# Patient Record
Sex: FEMALE | Race: ASIAN | NOT HISPANIC OR LATINO | Employment: PART TIME | URBAN - METROPOLITAN AREA
[De-identification: names, ages, dates, MRNs, and addresses within clinical notes are randomized per-mention and may not be internally consistent; named-entity substitution may affect disease eponyms.]

---

## 2017-06-16 ENCOUNTER — OFFICE VISIT - HEALTHEAST (OUTPATIENT)
Dept: FAMILY MEDICINE | Facility: CLINIC | Age: 42
End: 2017-06-16

## 2017-06-16 DIAGNOSIS — R21 RASH: ICD-10-CM

## 2017-08-07 ENCOUNTER — RECORDS - HEALTHEAST (OUTPATIENT)
Dept: ADMINISTRATIVE | Facility: OTHER | Age: 42
End: 2017-08-07

## 2017-09-24 ENCOUNTER — HEALTH MAINTENANCE LETTER (OUTPATIENT)
Age: 42
End: 2017-09-24

## 2017-12-20 ENCOUNTER — OFFICE VISIT - HEALTHEAST (OUTPATIENT)
Dept: FAMILY MEDICINE | Facility: CLINIC | Age: 42
End: 2017-12-20

## 2017-12-20 DIAGNOSIS — Z00.00 ROUTINE GENERAL MEDICAL EXAMINATION AT A HEALTH CARE FACILITY: ICD-10-CM

## 2017-12-20 DIAGNOSIS — E78.00 HYPERCHOLESTEREMIA: ICD-10-CM

## 2017-12-20 LAB
CHOLEST SERPL-MCNC: 208 MG/DL
FASTING STATUS PATIENT QL REPORTED: NO
HDLC SERPL-MCNC: 61 MG/DL
LDLC SERPL CALC-MCNC: 111 MG/DL
TRIGL SERPL-MCNC: 178 MG/DL

## 2017-12-20 ASSESSMENT — MIFFLIN-ST. JEOR: SCORE: 1216.95

## 2018-01-22 ENCOUNTER — HOSPITAL ENCOUNTER (OUTPATIENT)
Dept: ULTRASOUND IMAGING | Facility: CLINIC | Age: 43
Discharge: HOME | End: 2018-01-22
Attending: FAMILY MEDICINE

## 2018-01-22 ENCOUNTER — OFFICE VISIT - HEALTHEAST (OUTPATIENT)
Dept: FAMILY MEDICINE | Facility: CLINIC | Age: 43
End: 2018-01-22

## 2018-01-22 DIAGNOSIS — E06.1: ICD-10-CM

## 2018-01-22 DIAGNOSIS — E06.0 ACUTE THYROIDITIS: ICD-10-CM

## 2018-01-22 ASSESSMENT — MIFFLIN-ST. JEOR: SCORE: 1221.48

## 2018-01-23 LAB
T3 SERPL-MCNC: 118 NG/DL (ref 45–175)
T4 TOTAL - HISTORICAL: 9.2 UG/DL (ref 4.5–13)
TSH SERPL DL<=0.005 MIU/L-ACNC: 0.6 UIU/ML (ref 0.3–5)

## 2018-01-25 LAB — TSI SER-ACNC: <1 TSI INDEX

## 2018-02-05 ENCOUNTER — AMBULATORY - HEALTHEAST (OUTPATIENT)
Dept: LAB | Facility: CLINIC | Age: 43
End: 2018-02-05

## 2018-02-05 DIAGNOSIS — E06.1: ICD-10-CM

## 2018-02-05 LAB
ALBUMIN SERPL-MCNC: 3.3 G/DL (ref 3.5–5)
ALP SERPL-CCNC: 80 U/L (ref 45–120)
ALT SERPL W P-5'-P-CCNC: 10 U/L (ref 0–45)
AST SERPL W P-5'-P-CCNC: 14 U/L (ref 0–40)
BILIRUB DIRECT SERPL-MCNC: 0.1 MG/DL
BILIRUB SERPL-MCNC: 0.3 MG/DL (ref 0–1)
C REACTIVE PROTEIN LHE: 2.2 MG/DL (ref 0–0.8)
PROT SERPL-MCNC: 7 G/DL (ref 6–8)
TSH SERPL DL<=0.005 MIU/L-ACNC: 0.01 UIU/ML (ref 0.3–5)

## 2018-02-06 ENCOUNTER — COMMUNICATION - HEALTHEAST (OUTPATIENT)
Dept: FAMILY MEDICINE | Facility: CLINIC | Age: 43
End: 2018-02-06

## 2018-02-07 ENCOUNTER — COMMUNICATION - HEALTHEAST (OUTPATIENT)
Dept: FAMILY MEDICINE | Facility: CLINIC | Age: 43
End: 2018-02-07

## 2018-02-14 ENCOUNTER — OFFICE VISIT - HEALTHEAST (OUTPATIENT)
Dept: FAMILY MEDICINE | Facility: CLINIC | Age: 43
End: 2018-02-14

## 2018-02-14 DIAGNOSIS — E06.9 THYROIDITIS: ICD-10-CM

## 2018-02-14 ASSESSMENT — MIFFLIN-ST. JEOR: SCORE: 1226.02

## 2018-03-26 ENCOUNTER — COMMUNICATION - HEALTHEAST (OUTPATIENT)
Dept: LAB | Facility: CLINIC | Age: 43
End: 2018-03-26

## 2018-03-26 DIAGNOSIS — E06.0 ACUTE THYROIDITIS: ICD-10-CM

## 2018-03-27 ENCOUNTER — AMBULATORY - HEALTHEAST (OUTPATIENT)
Dept: LAB | Facility: CLINIC | Age: 43
End: 2018-03-27

## 2018-03-27 DIAGNOSIS — E06.0 ACUTE THYROIDITIS: ICD-10-CM

## 2018-03-27 LAB
T4 FREE SERPL-MCNC: 0.6 NG/DL (ref 0.7–1.8)
TSH SERPL DL<=0.005 MIU/L-ACNC: 0.14 UIU/ML (ref 0.3–5)

## 2018-06-06 ENCOUNTER — RECORDS - HEALTHEAST (OUTPATIENT)
Dept: ADMINISTRATIVE | Facility: OTHER | Age: 43
End: 2018-06-06

## 2018-12-14 ENCOUNTER — OFFICE VISIT - HEALTHEAST (OUTPATIENT)
Dept: FAMILY MEDICINE | Facility: CLINIC | Age: 43
End: 2018-12-14

## 2018-12-14 DIAGNOSIS — Z00.00 ROUTINE GENERAL MEDICAL EXAMINATION AT A HEALTH CARE FACILITY: ICD-10-CM

## 2018-12-14 DIAGNOSIS — E78.00 HYPERCHOLESTEREMIA: ICD-10-CM

## 2018-12-14 DIAGNOSIS — E06.0 ACUTE THYROIDITIS: ICD-10-CM

## 2018-12-14 LAB
ALBUMIN SERPL-MCNC: 4.3 G/DL (ref 3.5–5)
ALBUMIN UR-MCNC: NEGATIVE MG/DL
ALP SERPL-CCNC: 70 U/L (ref 45–120)
ALT SERPL W P-5'-P-CCNC: 16 U/L (ref 0–45)
ANION GAP SERPL CALCULATED.3IONS-SCNC: 8 MMOL/L (ref 5–18)
APPEARANCE UR: CLEAR
AST SERPL W P-5'-P-CCNC: 27 U/L (ref 0–40)
BILIRUB SERPL-MCNC: 0.7 MG/DL (ref 0–1)
BILIRUB UR QL STRIP: NEGATIVE
BUN SERPL-MCNC: 13 MG/DL (ref 8–22)
CALCIUM SERPL-MCNC: 9.8 MG/DL (ref 8.5–10.5)
CHLORIDE BLD-SCNC: 103 MMOL/L (ref 98–107)
CHOLEST SERPL-MCNC: 234 MG/DL
CO2 SERPL-SCNC: 31 MMOL/L (ref 22–31)
COLOR UR AUTO: YELLOW
CREAT SERPL-MCNC: 0.8 MG/DL (ref 0.6–1.1)
FASTING STATUS PATIENT QL REPORTED: ABNORMAL
GFR SERPL CREATININE-BSD FRML MDRD: >60 ML/MIN/1.73M2
GLUCOSE BLD-MCNC: 95 MG/DL (ref 70–125)
GLUCOSE UR STRIP-MCNC: NEGATIVE MG/DL
HDLC SERPL-MCNC: 75 MG/DL
HGB BLD-MCNC: 14.3 G/DL (ref 12–16)
HGB UR QL STRIP: NEGATIVE
KETONES UR STRIP-MCNC: NEGATIVE MG/DL
LDLC SERPL CALC-MCNC: 143 MG/DL
LEUKOCYTE ESTERASE UR QL STRIP: NEGATIVE
NITRATE UR QL: NEGATIVE
PH UR STRIP: 7.5 [PH] (ref 5–8)
POTASSIUM BLD-SCNC: 4.1 MMOL/L (ref 3.5–5)
PROT SERPL-MCNC: 7.3 G/DL (ref 6–8)
SODIUM SERPL-SCNC: 142 MMOL/L (ref 136–145)
SP GR UR STRIP: 1.01 (ref 1–1.03)
T4 FREE SERPL-MCNC: 1 NG/DL (ref 0.7–1.8)
TRIGL SERPL-MCNC: 79 MG/DL
TSH SERPL DL<=0.005 MIU/L-ACNC: 4.6 UIU/ML (ref 0.3–5)
UROBILINOGEN UR STRIP-ACNC: NORMAL

## 2018-12-17 LAB
HPV SOURCE: NORMAL
HUMAN PAPILLOMA VIRUS 16 DNA: NEGATIVE
HUMAN PAPILLOMA VIRUS 18 DNA: NEGATIVE
HUMAN PAPILLOMA VIRUS FINAL DIAGNOSIS: NORMAL
HUMAN PAPILLOMA VIRUS OTHER HR: NEGATIVE
SPECIMEN DESCRIPTION: NORMAL

## 2019-02-16 ENCOUNTER — AMBULATORY - HEALTHEAST (OUTPATIENT)
Dept: FAMILY MEDICINE | Facility: CLINIC | Age: 44
End: 2019-02-16

## 2019-02-16 ENCOUNTER — COMMUNICATION - HEALTHEAST (OUTPATIENT)
Dept: SCHEDULING | Facility: CLINIC | Age: 44
End: 2019-02-16

## 2019-02-16 DIAGNOSIS — J22 LOWER RESP. TRACT INFECTION: ICD-10-CM

## 2019-02-17 ENCOUNTER — COMMUNICATION - HEALTHEAST (OUTPATIENT)
Dept: SCHEDULING | Facility: CLINIC | Age: 44
End: 2019-02-17

## 2019-02-17 DIAGNOSIS — J22 LOWER RESP. TRACT INFECTION: ICD-10-CM

## 2019-05-15 ENCOUNTER — RECORDS - HEALTHEAST (OUTPATIENT)
Dept: ADMINISTRATIVE | Facility: OTHER | Age: 44
End: 2019-05-15

## 2019-08-19 ENCOUNTER — OFFICE VISIT - HEALTHEAST (OUTPATIENT)
Dept: FAMILY MEDICINE | Facility: CLINIC | Age: 44
End: 2019-08-19

## 2019-08-19 ENCOUNTER — RECORDS - HEALTHEAST (OUTPATIENT)
Dept: GENERAL RADIOLOGY | Facility: CLINIC | Age: 44
End: 2019-08-19

## 2019-08-19 ENCOUNTER — COMMUNICATION - HEALTHEAST (OUTPATIENT)
Dept: FAMILY MEDICINE | Facility: CLINIC | Age: 44
End: 2019-08-19

## 2019-08-19 DIAGNOSIS — R93.89 ABNORMAL X-RAY OF NECK: ICD-10-CM

## 2019-08-19 DIAGNOSIS — M54.2 NECK PAIN: ICD-10-CM

## 2019-08-19 DIAGNOSIS — M54.2 CERVICALGIA: ICD-10-CM

## 2019-08-19 DIAGNOSIS — G24.3 SPASMODIC TORTICOLLIS: ICD-10-CM

## 2019-08-19 ASSESSMENT — MIFFLIN-ST. JEOR: SCORE: 1235.09

## 2019-08-29 ENCOUNTER — COMMUNICATION - HEALTHEAST (OUTPATIENT)
Dept: FAMILY MEDICINE | Facility: CLINIC | Age: 44
End: 2019-08-29

## 2019-08-29 DIAGNOSIS — G24.3 SPASMODIC TORTICOLLIS: ICD-10-CM

## 2019-12-12 ENCOUNTER — OFFICE VISIT - HEALTHEAST (OUTPATIENT)
Dept: FAMILY MEDICINE | Facility: CLINIC | Age: 44
End: 2019-12-12

## 2019-12-12 DIAGNOSIS — Z12.4 PAP SMEAR FOR CERVICAL CANCER SCREENING: ICD-10-CM

## 2019-12-12 DIAGNOSIS — Z00.00 PREVENTATIVE HEALTH CARE: ICD-10-CM

## 2019-12-12 LAB
ANION GAP SERPL CALCULATED.3IONS-SCNC: 9 MMOL/L (ref 5–18)
BUN SERPL-MCNC: 15 MG/DL (ref 8–22)
CALCIUM SERPL-MCNC: 9.8 MG/DL (ref 8.5–10.5)
CHLORIDE BLD-SCNC: 103 MMOL/L (ref 98–107)
CHOLEST SERPL-MCNC: 254 MG/DL
CO2 SERPL-SCNC: 30 MMOL/L (ref 22–31)
CREAT SERPL-MCNC: 0.78 MG/DL (ref 0.6–1.1)
ERYTHROCYTE [DISTWIDTH] IN BLOOD BY AUTOMATED COUNT: 11.2 % (ref 11–14.5)
FASTING STATUS PATIENT QL REPORTED: YES
GFR SERPL CREATININE-BSD FRML MDRD: >60 ML/MIN/1.73M2
GLUCOSE BLD-MCNC: 94 MG/DL (ref 70–125)
HCT VFR BLD AUTO: 41.3 % (ref 35–47)
HDLC SERPL-MCNC: 71 MG/DL
HGB BLD-MCNC: 14.4 G/DL (ref 12–16)
LDLC SERPL CALC-MCNC: 169 MG/DL
MCH RBC QN AUTO: 32.5 PG (ref 27–34)
MCHC RBC AUTO-ENTMCNC: 34.8 G/DL (ref 32–36)
MCV RBC AUTO: 93 FL (ref 80–100)
PLATELET # BLD AUTO: 165 THOU/UL (ref 140–440)
PMV BLD AUTO: 7.7 FL (ref 7–10)
POTASSIUM BLD-SCNC: 4.1 MMOL/L (ref 3.5–5)
RBC # BLD AUTO: 4.42 MILL/UL (ref 3.8–5.4)
SODIUM SERPL-SCNC: 142 MMOL/L (ref 136–145)
TRIGL SERPL-MCNC: 70 MG/DL
TSH SERPL DL<=0.005 MIU/L-ACNC: 3.87 UIU/ML (ref 0.3–5)
WBC: 3.6 THOU/UL (ref 4–11)

## 2019-12-12 ASSESSMENT — MIFFLIN-ST. JEOR: SCORE: 1216.95

## 2019-12-20 LAB
BKR LAB AP ABNORMAL BLEEDING: NO
BKR LAB AP BIRTH CONTROL/HORMONES: NORMAL
BKR LAB AP CERVICAL APPEARANCE: NORMAL
BKR LAB AP GYN ADEQUACY: NORMAL
BKR LAB AP GYN INTERPRETATION: NORMAL
BKR LAB AP HPV REFLEX: NORMAL
BKR LAB AP LMP: NORMAL
BKR LAB AP PATIENT STATUS: NORMAL
BKR LAB AP PREVIOUS ABNORMAL: NO
BKR LAB AP PREVIOUS NORMAL: NORMAL
HIGH RISK?: NO
PATH REPORT.COMMENTS IMP SPEC: NORMAL
RESULT FLAG (HE HISTORICAL CONVERSION): NORMAL

## 2020-01-30 ENCOUNTER — COMMUNICATION - HEALTHEAST (OUTPATIENT)
Dept: FAMILY MEDICINE | Facility: CLINIC | Age: 45
End: 2020-01-30

## 2020-02-03 ENCOUNTER — OFFICE VISIT - HEALTHEAST (OUTPATIENT)
Dept: FAMILY MEDICINE | Facility: CLINIC | Age: 45
End: 2020-02-03

## 2020-02-03 DIAGNOSIS — R21 RASH: ICD-10-CM

## 2020-02-03 RX ORDER — RIBOFLAVIN (VITAMIN B2) 100 MG
1 TABLET ORAL 3 TIMES DAILY
Status: SHIPPED | COMMUNITY
Start: 2020-02-03

## 2020-02-03 RX ORDER — TRIAMCINOLONE ACETONIDE 1 MG/G
CREAM TOPICAL
Qty: 45 G | Refills: 0 | Status: SHIPPED | OUTPATIENT
Start: 2020-02-03

## 2020-02-03 ASSESSMENT — MIFFLIN-ST. JEOR: SCORE: 1221.48

## 2021-05-26 ENCOUNTER — RECORDS - HEALTHEAST (OUTPATIENT)
Dept: ADMINISTRATIVE | Facility: CLINIC | Age: 46
End: 2021-05-26

## 2021-05-31 VITALS — HEIGHT: 65 IN | BODY MASS INDEX: 21.33 KG/M2 | WEIGHT: 128 LBS

## 2021-05-31 VITALS — HEIGHT: 65 IN | WEIGHT: 127 LBS | BODY MASS INDEX: 21.16 KG/M2

## 2021-05-31 VITALS — BODY MASS INDEX: 20.35 KG/M2 | WEIGHT: 122.3 LBS

## 2021-05-31 NOTE — PROGRESS NOTES
PROGRESS NOTE       SUBJECTIVE:  Kina Brown is a 43 y.o. female   Chief Complaint   Patient presents with     Shoulder Pain     pt having shoulder and neck pain, on going since thursday, did accupunture , not sleeping    Patient states that she is suffering with left neck and shoulder pain.  [facility] [employer]  Wednesday she was perfectly fine and then Thursday it began.  She also has some low back pain that comes and goes but nothing bad.  She did seek care for acupuncture which helped temporarily but then it became worse on Sunday morning she has been taking medications which were her 's (Guille) Flexeril 10 mg twice daily and oxycodone 5 mg once or twice daily.  She took a gabapentin 300 mg last night and slept for 12 hours.  We talked about how it is not wise to take her 's medications which she knows.  She denies pain radiating down into her arms and she has no numbness or weakness in her arms.    Patient Active Problem List   Diagnosis     Anxiety     Nonspecific reaction to tuberculin skin test without active tuberculosis     Hypercholesteremia     Acute thyroiditis     Abnormal x-ray of neck       Current Outpatient Medications   Medication Sig Dispense Refill     ascorbic acid, vitamin C, (VITAMIN C) 1000 MG tablet Take 1,000 mg by mouth daily.       calcium carbonate (OS-TED) 600 mg (1,500 mg) tablet Take 600 mg by mouth 2 (two) times a day with meals.       cholecalciferol, vitamin D3, (VITAMIN D3) 2,000 unit Tab Take 1 tablet (2,000 Units total) by mouth daily. 90 each 3     MULTIVIT-MINERALS/FERROUS FUM (MULTI VITAMIN ORAL) Take by mouth.       cyclobenzaprine (FLEXERIL) 10 MG tablet Take 1 tablet (10 mg total) by mouth 3 (three) times a day as needed for muscle spasms. 30 tablet 0     oxyCODONE (ROXICODONE) 5 MG immediate release tablet Take 1 tablet (5 mg total) by mouth every 6 (six) hours as needed for pain. 20 tablet 0     No current  facility-administered medications for this visit.        Social History     Tobacco Use   Smoking Status Former Smoker     Packs/day: 1.00     Years: 10.00     Pack years: 10.00     Types: Cigarettes     Last attempt to quit: 1/1/2004     Years since quitting: 15.6   Smokeless Tobacco Never Used       REVIEW OF SYSTEMS:  Patient denies fever, chills, dizziness, headache, visual change, ear pain, cough, chest pain, shortness of breath, abdominal pain,  swelling, rash,  depression or anxiety.    Positive:   extremity pain.    OBJECTIVE:       Vitals:    08/19/19 1139   BP: 118/88   Pulse: 60   SpO2: 100%     Weight: 131 lb (59.4 kg)    Wt Readings from Last 3 Encounters:   08/19/19 131 lb (59.4 kg)   12/14/18 132 lb 12.8 oz (60.2 kg)   02/14/18 129 lb (58.5 kg)     Body mass index is 21.8 kg/m .        Physical Exam:  GENERAL APPEARANCE: Patient appears to be uncomfortable and holds her head very rigidly.  Her lungs are clear and heart sounds are normal.  She is able to turn her head full range of motion but is very cautious.  It is any pain radiation down her arms when she does this.  She was also able to fully raise her chin and lower her chin, but again,  was very cautious in doing so.  EXTREMITY: Extremities normal, atraumatic, no swelling  NEURO: no gross deficits.  Muscle strength appears intact and DTRs are full  PSYCHIATRIC:  Mood appropriate, memory intact        ASSESSMENT/PLAN:     1. Neck pain  I reviewed the films myself and they appear normal with the exception of loss of lordosis  - XR Cervical Spine 4 - 5 VWS; Future  2. Spasmodic torticollis  I think her treatment regimen is appropriate and I have provided her her own prescription for cyclobenzaprine 10 mg which she may take 3 times daily as needed.  This can cause drowsiness and she will be careful about this with driving.  Oxycodone should be reserved for severe pain only especially at night.  Generally torticollis responds best to manipulation and  patient does confess to me that she has an appointment with the chiropractor later today.  In this case, I think it is quite appropriate as long as she is familiar with the chiropractor and is done well with that treatment before.  Torticollis can often last week to 10 days.  - cyclobenzaprine (FLEXERIL) 10 MG tablet; Take 1 tablet (10 mg total) by mouth 3 (three) times a day as needed for muscle spasms.  Dispense: 30 tablet; Refill: 0  - oxyCODONE (ROXICODONE) 5 MG immediate release tablet; Take 1 tablet (5 mg total) by mouth every 6 (six) hours as needed for pain.  Dispense: 20 tablet; Refill: 0        I spent a total of 28 minutes face to face with the patient.  Over 50% of the time spent counseling and educating the patient about all of the above.      Carmen Edwards MD

## 2021-05-31 NOTE — TELEPHONE ENCOUNTER
Controlled Substance Refill Request  Medication:   Requested Prescriptions     Pending Prescriptions Disp Refills     oxyCODONE (ROXICODONE) 5 MG immediate release tablet 20 tablet 0     Sig: Take 1 tablet (5 mg total) by mouth every 6 (six) hours as needed for pain.     Date Last Fill: 8/19/19  Pharmacy: [id number]   Submit electronically to pharmacy  Controlled Substance Agreement on File:   Encounter-Level CSA Scan Date:    There are no encounter-level csa scan date.       Last office visit: Last office visit pertaining to requested medication was 8/19/19.

## 2021-06-01 ENCOUNTER — RECORDS - HEALTHEAST (OUTPATIENT)
Dept: ADMINISTRATIVE | Facility: CLINIC | Age: 46
End: 2021-06-01

## 2021-06-01 VITALS — WEIGHT: 129 LBS | HEIGHT: 65 IN | BODY MASS INDEX: 21.49 KG/M2

## 2021-06-02 VITALS — BODY MASS INDEX: 22.1 KG/M2 | WEIGHT: 132.8 LBS

## 2021-06-03 VITALS — HEIGHT: 65 IN | BODY MASS INDEX: 21.83 KG/M2 | WEIGHT: 131 LBS

## 2021-06-04 VITALS
SYSTOLIC BLOOD PRESSURE: 114 MMHG | HEIGHT: 65 IN | HEART RATE: 64 BPM | WEIGHT: 127 LBS | DIASTOLIC BLOOD PRESSURE: 70 MMHG | BODY MASS INDEX: 21.16 KG/M2 | TEMPERATURE: 97.6 F

## 2021-06-04 VITALS
HEIGHT: 65 IN | DIASTOLIC BLOOD PRESSURE: 70 MMHG | BODY MASS INDEX: 21.33 KG/M2 | SYSTOLIC BLOOD PRESSURE: 100 MMHG | HEART RATE: 62 BPM | WEIGHT: 128 LBS

## 2021-06-04 NOTE — PROGRESS NOTES
Kina Brown is a 44 y.o. female is here for a  Health Maintenance exam. Patient is overall doing well.      Healthy Habits:   Regular Exercise: Yes  Sunscreen Use: Yes  Healthy Diet: Yes  Dental Visits Regularly: Yes  Seat Belt: Yes  Sexually active: Yes  Self Breast Exam Monthly:Yes and No  Hemoccults: No  Flex Sig: No  Colonoscopy: No  Lipid Profile: Yes  Glucose Screen: Yes  Prevention of Osteoporosis: Yes  Last Dexa: No  Guns at Home:  Yes  Guns Safety Locks:  Yes  Domestic Violence:  No    Current Outpatient Medications Include:    Current Outpatient Medications:      calcium carbonate (OS-TED) 600 mg (1,500 mg) tablet, Take 600 mg by mouth 2 (two) times a day with meals., Disp: , Rfl:      cholecalciferol, vitamin D3, (VITAMIN D3) 2,000 unit Tab, Take 1 tablet (2,000 Units total) by mouth daily., Disp: 90 each, Rfl: 3     Lactobacillus acidophilus (PROBIOTIC ORAL), Take by mouth., Disp: , Rfl:      MULTIVIT-MINERALS/FERROUS FUM (MULTI VITAMIN ORAL), Take by mouth., Disp: , Rfl:      ascorbic acid, vitamin C, (VITAMIN C) 1000 MG tablet, Take 1,000 mg by mouth daily., Disp: , Rfl:      cyclobenzaprine (FLEXERIL) 10 MG tablet, Take 1 tablet (10 mg total) by mouth 3 (three) times a day as needed for muscle spasms., Disp: 30 tablet, Rfl: 0     oxyCODONE (ROXICODONE) 5 MG immediate release tablet, Take 1 tablet (5 mg total) by mouth every 6 (six) hours as needed for pain., Disp: 20 tablet, Rfl: 0    Allergies:  No Known Allergies    Past Medical History:   Diagnosis Date     History of MRSA infection      Hypercholesteremia      Miscarriage     no complications     Normal delivery     x2     Thyroiditis     acute     Type O blood, Rh positive        Past Surgical History:   Procedure Laterality Date     DC  DELIVERY ONLY      Description:  Section;  Recorded: 2013;     DC SUPRACERV ABD HYSTERECTOMY      Supracervical Hysterectomy; Emergency at time of  Section for placenta previa.   Left tube and ovary also removed (adherent to the uterus)       OB History    Para Term  AB Living   4 3 3 0 1 3   SAB TAB Ectopic Multiple Live Births   1   0 0 3      # Outcome Date GA Lbr Syed/2nd Weight Sex Delivery Anes PTL Lv   4 SAB            3 Term     M Vag-Spont      2 Term     M Vag-Spont      1 Term     F CS-Unspec         Birth Comments: placenta previa       Immunization History   Administered Date(s) Administered     Hep A, historic 2010, 2012     Hep B, Adult 2007, 2012     Influenza, Live, Nasal LAIV3 10/12/2010     Influenza, Seasonal, Inj PF IIV3 2012     Influenza, inj, historic,unspecified 10/20/2013, 2015, 2016, 10/18/2017     Influenza,live, Nasal Laiv4 2014     Influenza,seasonal quad, PF, =/> 6months 2013     Influenza,seasonal,quad inj =/> 6months 10/15/2018     Td,adult,historic,unspecified 2002     Tdap 2010       Family History   Problem Relation Age of Onset     Liver cancer Father      No Medical Problems Brother         , LIVES IN [place]     Hypertension Mother      Dementia Mother        Social History     Socioeconomic History     Marital status:      Spouse name: Guille     Number of children: 3     Years of education: Not on file     Highest education level: Not on file   Occupational History     Occupation: RN     Employer: [employer]   Social Needs     Financial resource strain: Not on file     Food insecurity:     Worry: Not on file     Inability: Not on file     Transportation needs:     Medical: Not on file     Non-medical: Not on file   Tobacco Use     Smoking status: Former Smoker     Packs/day: 1.00     Years: 10.00     Pack years: 10.00     Types: Cigarettes     Last attempt to quit: 2004     Years since quitting: 15.9     Smokeless tobacco: Never Used   Substance and Sexual Activity     Alcohol use: Yes     Frequency: 2-4 times a month     Drinks per  session: 1 or 2     Binge frequency: Never     Comment: occasional, once per week     Drug use: No     Sexual activity: Yes     Partners: Male     Birth control/protection: Surgical     Comment: hysterectomy   Lifestyle     Physical activity:     Days per week: Not on file     Minutes per session: Not on file     Stress: Not on file   Relationships     Social connections:     Talks on phone: Not on file     Gets together: Not on file     Attends Scientologist service: Not on file     Active member of club or organization: Not on file     Attends meetings of clubs or organizations: Not on file     Relationship status: Not on file     Intimate partner violence:     Fear of current or ex partner: Not on file     Emotionally abused: Not on file     Physically abused: Not on file     Forced sexual activity: Not on file   Other Topics Concern     Not on file   Social History Narrative    1st born [place].         Last cholesterol:   Lab Results   Component Value Date    CHOL 234 (H) 2018    CHOL 208 (H) 2017    CHOL 217 (H) 2016     Lab Results   Component Value Date    HDL 75 2018    HDL 61 2017    HDL 55 2016     Lab Results   Component Value Date    LDLCALC 143 (H) 2018    LDLCALC 111 2017    LDLCALC 137 (H) 2016     Lab Results   Component Value Date    TRIG 79 2018    TRIG 178 (H) 2017    TRIG 127 2016     No components found for: CHOLHDL    Mammogram 5/15/19  Pap smear      Birth Control Method: None  High Risk/Behavior: None      LMP: No LMP recorded. Patient has had a hysterectomy.  Menstrual Regularity: None  Flow: None    Mammogram done 5/15/2019  Pap smear: Last year.  (Patient had a supracervical emergency hysterectomy for bleeding during a  for placenta previa)  Review of Systems:   General:  Denies fever, chills, HA, fatigue, myalgias, weight change    Eyes: Denies vision changes   Ears/Nose/Throat: Denies nasal  "congestion, rhinorrhea, ear pain or discharge, sore throat, swollen glands  Cardiovascular: Denies CP, palpitations  Respiratory:  Denies SOB, cough  Gastrointestinal:  Denies changes in bowel habits, melena, rectal bleeding,  Genitourinary: Denies changes in urine habits/frequency/dysuria, hematuria   Musculoskeletal:  Denies  joint pain or swelling or erythema, edema  Skin: Denies rashes   Neurologic: Denies weakness, paresthesia  Psychiatric: Denies mood changes   Endocrine: Denies polyuria, polydipsia, polyphagia  Heme/Lymphatic: Denies problem with bleeding   Allergic/Immunologic: Denies problem     POSITIVES: 112 point review of systems is negative  Patient is  and her  Guille was diagnosed with papillary thyroid cancer at [facility] in March of this year.  He has done quite well.  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  She drinks occasional alcoholic beverage but not even weekly.  She used to smoke but that was over 10 years ago.  She eats healthy and exercises regularly.    PHYSICAL EXAM:    /70   Pulse 64   Temp 97.6  F (36.4  C)   Ht 5' 5\" (1.651 m)   Wt 127 lb (57.6 kg)   Breastfeeding No   BMI 21.13 kg/m      Wt Readings from Last 3 Encounters:   08/19/19 131 lb (59.4 kg)   12/14/18 132 lb 12.8 oz (60.2 kg)   02/14/18 129 lb (58.5 kg)       Body mass index is 21.13 kg/m .    Well developed, well nourished, no acute distress.  HEENT: normocephalic/atraumatic, PERRLA/EOMI, TMs: Gray, normal light reflex, no nasal discharge.  Oral mucosa: no erythema/exudate  Neck: No LAD/masses/thyromegaly/bruits  Lungs: clear bilaterally  Heart: regular rate and rhythm, no murmurs/gallops/rubs  Breasts: symmetric, no masses/skin changes, nipple discharge, or axillary LAD.  BSE reviewed.  Abdomen: Normal bowel sounds, soft, non-tender, non-distended, no masses, neg Reyna's/McBurney's, no rebound/guarding  Genital: Normal external genitalia, no discharge, no lesions, cervix is non-friable, os is " closed, no CMT, no adnexal tenderness or fullness.  Uterus is surgically absent, perineum intact.  Thin prep done.    Rectal: internal exam is deferred.  External exam is normal.  Lymphatics: no supraclavicular/axillary/epitrochlear/inguinal LAD. No edema.  Neuro: A&O x 3, CN II-XII intact, strength 5/5, reflexes symmetric, sensory intact to light touch.  Psych: Behavior appropriate, engaging.  Thought processes congruent, non-tangential.  Musculoskeletal: Extremities normal, atraumatic, no swelling  Skin: no rashes or lesions.      Recent Results (from the past 240 hour(s))   Thyroid Stimulating Hormone (TSH)   Result Value Ref Range    TSH 3.87 0.30 - 5.00 uIU/mL   HM2(CBC w/o Differential)   Result Value Ref Range    WBC 3.6 (L) 4.0 - 11.0 thou/uL    RBC 4.42 3.80 - 5.40 mill/uL    Hemoglobin 14.4 12.0 - 16.0 g/dL    Hematocrit 41.3 35.0 - 47.0 %    MCV 93 80 - 100 fL    MCH 32.5 27.0 - 34.0 pg    MCHC 34.8 32.0 - 36.0 g/dL    RDW 11.2 11.0 - 14.5 %    Platelets 165 140 - 440 thou/uL    MPV 7.7 7.0 - 10.0 fL   Basic Metabolic Panel   Result Value Ref Range    Sodium 142 136 - 145 mmol/L    Potassium 4.1 3.5 - 5.0 mmol/L    Chloride 103 98 - 107 mmol/L    CO2 30 22 - 31 mmol/L    Anion Gap, Calculation 9 5 - 18 mmol/L    Glucose 94 70 - 125 mg/dL    Calcium 9.8 8.5 - 10.5 mg/dL    BUN 15 8 - 22 mg/dL    Creatinine 0.78 0.60 - 1.10 mg/dL    GFR MDRD Af Amer >60 >60 mL/min/1.73m2    GFR MDRD Non Af Amer >60 >60 mL/min/1.73m2   Lipid Cascade   Result Value Ref Range    Cholesterol 254 (H) <=199 mg/dL    Triglycerides 70 <=149 mg/dL    HDL Cholesterol 71 >=50 mg/dL    LDL Calculated 169 (H) <=129 mg/dL    Patient Fasting > 8hrs? Yes        ASSESSMENT/PLAN: 44 y.o. female physical exam and pap smear.          1. Preventative health care    - Thyroid Stimulating Hormone (TSH)  - HM2(CBC w/o Differential)  - Basic Metabolic Panel  - Lipid Cascade    2. Pap smear for cervical cancer screening    - Gynecologic Cytology  (PAP Smear)      There are no discontinued medications.    Routine health maintenance discussion:  No smoking, limited alcohol (7 or less servings per week), 5 fruits/veg servings per day, 200 minutes of exercise per week.  Daily calcium/vitamin D guidelines, bone health, yearly mammogram after age 39/regular pap smears/colon cancer screening beginning at age 50.  Accident avoidance, sun screen.      Will contact her with the results of the labs when available.    Carmen Edwards M.D.

## 2021-06-05 NOTE — PROGRESS NOTES
PROGRESS NOTE       SUBJECTIVE:  Kina Brown is a 44 y.o. female   Chief Complaint   Patient presents with     Rash     on left elbow  itching, comes and goes,  for years , started on knee and has cleared.     Patient states that she has had intermittent rash on her elbows for a long long time.  It is worse on the left elbow and it is always been present but sometimes it gets worse than others.  She has not noticed it on her knees.  It itches when it gets inflamed.  When it gets worse it then it goes to the right elbow.  She is simply used over-the-counter preparations which is soothing but does not control the rash.    Patient is frustrated in her current job working at [employer], not the work so much but the social problems that the people are involved in.  We talked about finding work that is rewarding and it sounds like she needs to move on.    Patient states that whenever she gets a cold it moves into her chest and she wonders why that is.  Is or something wrong with her lungs.  She has a history of smoking 1 pack a day for about 10 years but it was actually less than that.  Denies exertional shortness of breath and she does not have allergies.    Patient Active Problem List   Diagnosis     Anxiety     Nonspecific reaction to tuberculin skin test without active tuberculosis     Hypercholesteremia     Acute thyroiditis     Abnormal x-ray of neck       Current Outpatient Medications   Medication Sig Dispense Refill     ascorbic acid, vitamin C, (VITAMIN C) 1000 MG tablet Take 1,000 mg by mouth daily.       calcium carbonate (OS-TED) 600 mg (1,500 mg) tablet Take 600 mg by mouth 2 (two) times a day with meals.       cholecalciferol, vitamin D3, (VITAMIN D3) 2,000 unit Tab Take 1 tablet (2,000 Units total) by mouth daily. 90 each 3     glucosamine-chondroitin 500-400 mg tablet Take 1 tablet by mouth 3 (three) times a day.       Lactobacillus acidophilus (PROBIOTIC ORAL) Take by mouth.       MULTIVIT-MINERALS/FERROUS  FUM (MULTI VITAMIN ORAL) Take by mouth.       triamcinolone (KENALOG) 0.1 % cream Apply sparingly to rash twice daily until clear, then repeat as needed. 45 g 0     No current facility-administered medications for this visit.        Social History     Tobacco Use   Smoking Status Former Smoker     Packs/day: 1.00     Years: 10.00     Pack years: 10.00     Types: Cigarettes     Last attempt to quit: 2004     Years since quittin.1   Smokeless Tobacco Never Used       REVIEW OF SYSTEMS:  Patient denies fever, chills, dizziness, headache, visual change, ear pain, cough, chest pain, shortness of breath, abdominal pain, extremity pain or swelling, rash,  depression or anxiety.    OBJECTIVE:       Vitals:    20 1353   BP: 100/70   Pulse: 62     Weight: 128 lb (58.1 kg)    Wt Readings from Last 3 Encounters:   20 128 lb (58.1 kg)   19 127 lb (57.6 kg)   19 131 lb (59.4 kg)     Body mass index is 21.3 kg/m .        Physical Exam:  GENERAL APPEARANCE: A&A, NAD, well hydrated, well nourished  SKIN:  Normal skin turgor, she has thickened flat lesions on her left elbow without scaling, right elbow appears clear with may be just a suggestion of some thickened skin.  She has no rash on her knees.  NECK: Supple, without lymphadenopathy, no thyroid mass  CV: RRR, no M/G/R   LUNGS: CTAB, normal respiratory effort  PSYCHIATRIC:  Mood appropriate, memory intact        ASSESSMENT/PLAN:     1. Rash  This appears to be a mild psoriasis.  I recommended treating with Kenalog cream sparingly twice daily until clear then continue her over-the-counter products.  She understands that if she were to use this medicine on normal skin it can cause thinning and even stretch marks.  - triamcinolone (KENALOG) 0.1 % cream; Apply sparingly to rash twice daily until clear, then repeat as needed.  Dispense: 45 g; Refill: 0      There are no Patient Instructions on file for this visit.  Medications Discontinued During This  Encounter   Medication Reason     cyclobenzaprine (FLEXERIL) 10 MG tablet Therapy completed     oxyCODONE (ROXICODONE) 5 MG immediate release tablet Therapy completed     No follow-ups on file.    I spent a total of 20 minutes face to face with the patient.  Over 50% of the time spent counseling and educating the patient about all of the above.      Carmen Edwards MD

## 2021-06-11 NOTE — PROGRESS NOTES
OV   6/16/2017  Assessment & Plan:      1. Rash  sulfamethoxazole-trimethoprim (BACTRIM DS) 800-160 mg per tablet    Ambulatory referral to Dermatology         We reviewed the potential etiologies for her rash symptoms and we will cover with oral antibiotics. Patient does have Triamcinolone cream at home so we discussed applying this to the four sites the are infected. She will apply this daily for 3 weeks. We reviewed indications for re-evaluation and she will call or return to clinic with any additional problems or concerns. Referral placed for dermatology consult. Patient will follow up on an as needed basis. 20 minutes spent together with the patient today, more than 50% spent in counseling, discussing the above topics.        Subjective:               Kina Brown is a 41 y.o. female who presents for evaluation of a rash/lesion involving the right (thumb) finger, forearm and lower extremity (lower left leg). It started 1 week ago. Lesions are  raised, bloody, pink and purple, and papular. Rash has changed over time. Rash is pruritic. Associated symptoms: irritation localized to site. Patient denies: abdominal pain, decrease in appetite, fever, headache and nausea.        Patient has not had contacts with similar rash. Patient has not had new exposures (soaps, lotions, laundry detergents, foods, medications, plants, insects or animals). She recently vacationed in the [facility] and was bitten by some insects while on vacation. She states that this typically happens anytime she vacations and is outdoors for extended periods of time. She does have history of having MRSA. Her  and children also have history of MRSA infection.  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    The following portions of the patient's history were reviewed and updated as appropriate: allergies, current medications, past family history, past medical history, past social history, past surgical history  and problem list.    Review of Systems  Denies new soaps, detergents, lotions, makeup, foods, bedding, sick contacts, fever or chills.       Objective:      /62 (Patient Site: Left Arm, Patient Position: Sitting, Cuff Size: Adult Regular)  Pulse 72  Temp 98.2  F (36.8  C) (Oral)   Resp 16  Wt 122 lb 4.8 oz (55.5 kg)  Breastfeeding? No  BMI 20.35 kg/m2  General appearance: alert, appears stated age and cooperative   Head: Normocephalic, without obvious abnormality, atraumatic   Heart: RRR, S1S2, no murmurs, gallop or rub  Lungs: CTA in all lobes  Lymph: negative cervical adenopathy  Abd: soft, BS+ in all quadrants, non-tender  General:  alert, appears stated age and cooperative   Skin:  2 x 2 cm area on left lower leg. Annular, no central clearing, scattered area inside rash, mildy raised, papular, non-draining bumps. Right forearm with localized rash with abrasion covering it from patient scratching it. Right thumb with 2 small abrasions, again from patient itching site. Mild erythema surrounding rash, no drainage noted.         Dhara Shah, NP
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
## 2021-06-16 PROBLEM — R93.89 ABNORMAL X-RAY OF NECK: Status: ACTIVE | Noted: 2019-08-19

## 2021-06-16 PROBLEM — E06.0 ACUTE THYROIDITIS: Status: ACTIVE | Noted: 2018-01-22

## 2021-06-16 PROBLEM — E78.00 HYPERCHOLESTEREMIA: Status: ACTIVE | Noted: 2017-12-20

## 2021-06-16 NOTE — PROGRESS NOTES
PROGRESS NOTE       SUBJECTIVE:  Kina Brown is a 42 y.o. female   Chief Complaint   Patient presents with     Thyroid Problem     pt having alot of pain waking pt up during the night, fever off and on.      Rash     on elbows and legs      I asked him need to come in because she was experiencing a lot of pain in her neck and thyroid area.  She has done a fair amount of reading about thyroiditis and she really is hoping that she can sail through this.  She has a friend who had postpartum thyroiditis.  We talked about how each case can be different, however.  Any denies any symptoms of palpitation and she has been checking her pulse and it stays in the normal range.  In fact, she has not noted anything even in the 90s.  She denies shortness of breath.  No indigestion nausea or vomiting.  Her skin is normal.  No increased anxiety or tremors.  She has not lost weight.    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       cholecalciferol, vitamin D3, (VITAMIN D3) 2,000 unit Tab Take 1 tablet (2,000 Units total) by mouth daily. 90 each 3     MULTIVIT-MINERALS/FERROUS FUM (MULTI VITAMIN ORAL) Take by mouth.       calcium carbonate (OS-TED) 600 mg (1,500 mg) tablet Take 600 mg by mouth 2 (two) times a day with meals.       omeprazole (PRILOSEC) 20 MG capsule Take 1 capsule (20 mg total) by mouth daily before breakfast. 30 capsule 5     No current facility-administered medications for this visit.        History   Smoking Status     Former Smoker     Types: Cigarettes     Quit date: 1/1/2004   Smokeless Tobacco     Never Used       REVIEW OF SYSTEMS:  Patient denies fever, chills, dizziness, headache, visual change, cough, chest  pain, shortness of breath, abdominal pain, extremity pain or swelling.          OBJECTIVE:       Vitals:    02/14/18 1119   BP: 120/84   Temp: 98.2  F (36.8  C)     Weight: 129 lb (58.5 kg)  Wt Readings from Last 3 Encounters:   02/14/18 129 lb (58.5 kg)   01/22/18 128 lb (58.1 kg)   12/20/17 127 lb (57.6 kg)     Body mass index is 21.47 kg/(m^2).    Pulse was between 65 and 72 while listening.    Physical Exam:  GENERAL APPEARANCE: A&A, NAD, well hydrated, well nourished  SKIN:  Normal skin turgor, no lesions/rashes   EARS: TM's normal, gray with nl light reflex  OROPHARYNX: without erythema, no post nasal drainage or thrush  NECK: Supple, without lymphadenopathy, her goiter is not directly tender.  She states the pain is referred up underneath.  She feels her thyroid gland they are when she swallows but has not choked or had any difficulty swallowing.  CV: RRR, no M/G/R   LUNGS: CTAB, normal respiratory effort  ABDOMEN: S&NT, no masses, no organomegaly   EXTREMITY: Extremities normal, atraumatic, no swelling  NEURO: no gross deficits, DTRs are normal  PSYCHIATRIC:  Mood appropriate, memory intact        ASSESSMENT/PLAN:     1. Thyroiditis  I recommended a trial of gabapentin at bedtime and limited oxycodone for pain.  She will check her pulse daily and notify me if it rises into the 90s.  If she develops any other symptoms of hyperthyroidism she will let me know.  Otherwise we will stick to the plan of monthly blood tests.  I offered her endocrinology consult and she declines.  We also talked about thyroid  uptake scan, but she prefers not.  - gabapentin (NEURONTIN) 300 MG capsule; Take one at bedtime  Dispense: 90 capsule; Refill: 0  - oxyCODONE (ROXICODONE) 5 MG immediate release tablet; Take 1 tablet (5 mg total) by mouth every 4 (four) hours as needed for pain.  Dispense: 30 tablet; Refill: 0    There are no Patient Instructions on file for this visit.  There are no discontinued medications.  She will return  for monthly blood work and notify me if she develops any symptoms.  The visit lasted a total of 25 minutes face to face with the patient.  Over 50% of the time spent counseling and educating the patient about all of the above.      Carmen Edwards MD
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
## 2021-06-22 NOTE — PROGRESS NOTES
Kina Brown is a 43 y.o. here for a Pap smear and physical exam. Patient is overall doing well.  She is up-to-date with her immunizations.  She has already had a flu vaccination this season.  She has completed a series of hepatitis B vaccinations as well.  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  Will repeat Pap today as it has been several years since she has had a Pap smear.  She did have a hysterectomy at the time of her baby as she had  delivery for placenta previa and then had bleeding issues.  She does note that she has a long-standing issue with constipation but that has not changed.  She continues to use MiraLAX on a daily basis and that seems to help.  She also has increased her water intake and that seems to help as well.  She is wondering if I have any information or can offer any suggestions for sun allergy.  She notes that if she is out in the sun too much for too long of a time she gets an itchy rash.  She just recently got back from [place] and the rash is they are now and she is wondering if there is anything that can be done for it.  Patient has had a history of elevated cholesterol in the past and we will recheck that today.  She has also had issues with thyroiditis in the past and we will recheck thyroid levels today as well.    Healthy Habits:   Regular Exercise: Yes  Sunscreen Use: Yes  Healthy Diet: Yes  Dental Visits Regularly: No  Seat Belt: Yes  Sexually active: Yes  Self Breast Exam Monthly:No  Hemoccults: No  Flex Sig: No  Colonoscopy: No  Lipid Profile: Yes  Glucose Screen: Yes  Prevention of Osteoporosis: Yes  Last Dexa: No and N/A  Guns at Home:  No  Domestic Violence:  No    Current Outpatient Medications Include:    Current Outpatient Medications:      ascorbic acid, vitamin C, (VITAMIN C) 1000 MG tablet, Take 1,000 mg by mouth daily., Disp: ,  Rfl:      calcium carbonate (OS-TED) 600 mg (1,500 mg) tablet, Take 600 mg by mouth 2 (two) times a day with meals., Disp: , Rfl:      cholecalciferol, vitamin D3, (VITAMIN D3) 2,000 unit Tab, Take 1 tablet (2,000 Units total) by mouth daily., Disp: 90 each, Rfl: 3     MULTIVIT-MINERALS/FERROUS FUM (MULTI VITAMIN ORAL), Take by mouth., Disp: , Rfl:     Allergies:  No Known Allergies    Past Medical History:   Diagnosis Date     History of MRSA infection      Hypercholesteremia      Miscarriage     no complications     Normal delivery     x2     Thyroiditis     acute     Type O blood, Rh positive        Past Surgical History:   Procedure Laterality Date     [facility]  DELIVERY ONLY      Description:  Section;  Recorded: 2013;     [facility] SUPRACERV ABD HYSTERECTOMY      Supracervical Hysterectomy; Emergency at time of  Section for placenta previa.  Left tube and ovary also removed (adherent to the uterus)       Immunization History   Administered Date(s) Administered     Hep A, historic 2010, 2012     Hep B, Adult 2007, 2012     Influenza, Live, Nasal LAIV3 10/12/2010     Influenza, Seasonal, Inj PF IIV3 2012     Influenza, inj, historic,unspecified 10/20/2013, 2015, 2016, 10/18/2017     Influenza, seasonal,quad inj 36+ mos 10/15/2018     Influenza,live, Nasal Laiv4 2014     Influenza,seasonal quad, PF, 36+MOS 2013     Td,adult,historic,unspecified 2002     Tdap 2010       Family History   Problem Relation Age of Onset     Liver cancer Father      No Medical Problems Brother         , LIVES IN [employer]     Hypertension Mother      Dementia Mother        Social History     Socioeconomic History     Marital status:      Spouse name: Guille     Number of children: 3     Years of education: Not on file     Highest education level: Not on file   Social Needs     Financial resource strain: Not on file     Food insecurity - worry:  Not on file     Food insecurity - inability: Not on file     Transportation needs - medical: Not on file     Transportation needs - non-medical: Not on file   Occupational History     Occupation: RN     Employer: [employer]   Tobacco Use     Smoking status: Former Smoker     Packs/day: 1.00     Years: 10.00     Pack years: 10.00     Types: Cigarettes     Last attempt to quit: 2004     Years since quittin.9     Smokeless tobacco: Never Used   Substance and Sexual Activity     Alcohol use: Yes     Frequency: 2-4 times a month     Drinks per session: 1 or 2     Binge frequency: Never     Comment: occasional, once per week     Drug use: No     Sexual activity: Yes     Partners: Male     Birth control/protection: Surgical     Comment: hysterectomy   Other Topics Concern     Not on file   Social History Narrative    1st born [place].         Last cholesterol:   Lab Results   Component Value Date    CHOL 234 (H) 2018    CHOL 208 (H) 2017    CHOL 217 (H) 2016     Lab Results   Component Value Date    HDL 75 2018    HDL 61 2017    HDL 55 2016     Lab Results   Component Value Date    LDLCALC 143 (H) 2018    LDLCALC 111 2017    LDLCALC 137 (H) 2016     Lab Results   Component Value Date    TRIG 79 2018    TRIG 178 (H) 2017    TRIG 127 2016       Last mammogram: 18    Birth Control Method: Hysterectomy  High Risk/Behavior: none    PREVENTATIVE SERVICES  Preventative services were reviewed today, 2018    LMP: No LMP recorded. Patient has had a hysterectomy.  Menstrual Regularity: n/a  Flow: n/a    REVIEW OF SYSTEMS:  Denies fever, chills, visual changes, fatigue, myalgias, nasal congestion, rhinorrhea, ear pain or discharge, sore throat, swollen glands, breast mass, nipple discharge, breast changes, abdominal pain, nausea, vomiting, diarrhea, constipation, cough, shortness of breath, chest pain,  weight change, change in bowel habits, melena, rectal bleeding, dysuria, frequency, urgency, hematuria, polyuria, polydipsia, polyphagia, joint pain or swelling or erythema, edema, rash, weakness, paresthesias, vaginal discharge or bleeding or mood changes.  Remainder of review of systems was negative.      PHYSICAL EXAM:  On exam, patient is a WD, WN 43 y.o. female in NAD.  BP 99/60   Pulse 60   Wt 132 lb 12.8 oz (60.2 kg)   SpO2 99%   BMI 22.10 kg/m    Head normocephalic, atraumatic.  Eyes PERRL, ears TM s clear bilaterally.  Throat without significant erythemia or exudate.  Neck was supple, full range of motion. No significant lymphadenopathy or thyromegaly was appreciated.  Lungs clear to auscultation  Heart regular rate and rhythm.  Breast exam was done. No masses were appreciated. Axilla were clear bilaterally. No nipple discharge was appreciated. Self breast exam was reviewed and taught today.  Abdomen was soft, nontender, nondistended. No masses or organomegaly were palpated. Positive bowel sounds were appreciated.  Extremities with full range of motion of all 4 extremities were noted.  Deep tendon reflexes were equal and symmetrical. Motor and sensation were intact to both the upper and lower extremities.  Cranial nerves 2 through 12 were grossly intact.  EOM were intact.  Pelvic exam was done.  External genitalia appeared normal. Speculum was introduced and the Pap smear obtained. Bimanual exam with no palpable masses.   No significant rash was noted on her skin.  She does have tan marks from her bathing suit but there is no significant rash that is appreciated.    Recent Results (from the past 240 hour(s))   Hemoglobin   Result Value Ref Range    Hemoglobin 14.3 12.0 - 16.0 g/dL   Urinalysis Macroscopic   Result Value Ref Range    Color, UA Yellow Colorless, Yellow, Straw, Light Yellow    Clarity, UA Clear Clear    Glucose, UA Negative Negative    Bilirubin, UA Negative Negative    Ketones, UA  Negative Negative    Specific Gravity, UA 1.010 1.005 - 1.030    Blood, UA Negative Negative    pH, UA 7.5 5.0 - 8.0    Protein, UA Negative Negative mg/dL    Urobilinogen, UA 0.2 E.U./dL 0.2 E.U./dL, 1.0 E.U./dL    Nitrite, UA Negative Negative    Leukocytes, UA Negative Negative   Lipid Cascade   Result Value Ref Range    Cholesterol 234 (H) <=199 mg/dL    Triglycerides 79 <=149 mg/dL    HDL Cholesterol 75 >=50 mg/dL    LDL Calculated 143 (H) <=129 mg/dL    Patient Fasting > 8hrs? Unknown    Comprehensive Metabolic Panel   Result Value Ref Range    Sodium 142 136 - 145 mmol/L    Potassium 4.1 3.5 - 5.0 mmol/L    Chloride 103 98 - 107 mmol/L    CO2 31 22 - 31 mmol/L    Anion Gap, Calculation 8 5 - 18 mmol/L    Glucose 95 70 - 125 mg/dL    BUN 13 8 - 22 mg/dL    Creatinine 0.80 0.60 - 1.10 mg/dL    GFR MDRD Af Amer >60 >60 mL/min/1.73m2    GFR MDRD Non Af Amer >60 >60 mL/min/1.73m2    Bilirubin, Total 0.7 0.0 - 1.0 mg/dL    Calcium 9.8 8.5 - 10.5 mg/dL    Protein, Total 7.3 6.0 - 8.0 g/dL    Albumin 4.3 3.5 - 5.0 g/dL    Alkaline Phosphatase 70 45 - 120 U/L    AST 27 0 - 40 U/L    ALT 16 0 - 45 U/L   Thyroid Stimulating Hormone (TSH)   Result Value Ref Range    TSH 4.60 0.30 - 5.00 uIU/mL   T4, Free   Result Value Ref Range    Free T4 1.0 0.7 - 1.8 ng/dL       ASSESSMENT: 43 y.o. female physical exam and pap smear.    PLAN:     Routine general medical examination at a health care facility [Z00.00]    1. Routine general medical examination at a health care facility  - Hemoglobin  - Urinalysis Macroscopic  - Lipid Cascade  - Gynecologic Cytology (PAP Smear)    2. Hypercholesteremia  - Lipid Cascade  - Comprehensive Metabolic Panel    3. Acute thyroiditis  - Thyroid Stimulating Hormone (TSH)  - T4, Free      Patient is a 43 y.o. female who is overall doing well.  We will recheck thyroid levels today as she has had a history of acute thyroiditis in the past as well as cholesterol levels as these have been elevated in  the past.  She does not have any significant rash at this point and therefore we will continue to monitor her symptoms.  She has worsening of the rash or has increasing itching she certainly should let us know.  She will obtain records from her hepatitis B vaccinations and get them sent to me as well as her flu vaccination.  All of her questions and concerns were addressed today.  If she has additional problems or concerns should let me know.    Will contact her with the results of the labs when available.  Luna Clark M.D.

## 2021-06-24 NOTE — TELEPHONE ENCOUNTER
calling that pt started Z-pac that was ordered yesterday and took the first two tablets on Day 1.   states that prescription was accidentally tossed in the garbage at the gas station and requesting refill for the remaining 4 tablets.  On call paged at 9:24 am.  Dr. Higgins returned page @ 9:52 am.  Rx faxed to Pharmacy.  This information called to .  Angelica Phelps RN, MA  [facility] RN Triage

## 2021-06-24 NOTE — TELEPHONE ENCOUNTER
Daughter was seen in [facility] office yesterday by Dr ARTURO Higgins:checked for strep throat. Mother has similar sx: x 3 days of cough with greenish color phlegm. No fever. Daughter has been ill x 7 days. Mother does not have a fever. Mother is requesting antibiotic for both her daughter and herself.    Contacted Dr ARTURO Higgins (on call). He will contact mother and discuss further.     Twila Gilmore RN [facility] Triage Nurse

## 2021-06-24 NOTE — PROGRESS NOTES
Patient has cough and fever that is getting worse over past 5 days. Daughter has similar symptoms but for 7 days. Will treat with azithromycin 500 mg today and then 250 mg day 2-5. Instructions given to  to be seen if she is not improved by Monday.

## 2021-08-15 ENCOUNTER — HEALTH MAINTENANCE LETTER (OUTPATIENT)
Age: 46
End: 2021-08-15

## 2021-10-11 ENCOUNTER — HEALTH MAINTENANCE LETTER (OUTPATIENT)
Age: 46
End: 2021-10-11

## 2022-09-25 ENCOUNTER — HEALTH MAINTENANCE LETTER (OUTPATIENT)
Age: 47
End: 2022-09-25